# Patient Record
Sex: MALE | Race: BLACK OR AFRICAN AMERICAN | NOT HISPANIC OR LATINO | Employment: FULL TIME | URBAN - METROPOLITAN AREA
[De-identification: names, ages, dates, MRNs, and addresses within clinical notes are randomized per-mention and may not be internally consistent; named-entity substitution may affect disease eponyms.]

---

## 2023-04-07 ENCOUNTER — OFFICE VISIT (OUTPATIENT)
Dept: URGENT CARE | Facility: CLINIC | Age: 63
End: 2023-04-07
Payer: OTHER MISCELLANEOUS

## 2023-04-07 VITALS
RESPIRATION RATE: 16 BRPM | SYSTOLIC BLOOD PRESSURE: 152 MMHG | WEIGHT: 250 LBS | DIASTOLIC BLOOD PRESSURE: 75 MMHG | OXYGEN SATURATION: 98 % | HEIGHT: 73 IN | TEMPERATURE: 99 F | HEART RATE: 88 BPM | BODY MASS INDEX: 33.13 KG/M2

## 2023-04-07 DIAGNOSIS — S20.211D CONTUSION OF RIB ON RIGHT SIDE, SUBSEQUENT ENCOUNTER: ICD-10-CM

## 2023-04-07 DIAGNOSIS — Z02.6 ENCOUNTER RELATED TO WORKER'S COMPENSATION CLAIM: Primary | ICD-10-CM
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
## 2023-04-07 DIAGNOSIS — S89.91XD LEG INJURY, RIGHT, SUBSEQUENT ENCOUNTER: ICD-10-CM

## 2023-04-07 DIAGNOSIS — S72.491D OTHER CLOSED FRACTURE OF DISTAL END OF RIGHT FEMUR WITH ROUTINE HEALING, SUBSEQUENT ENCOUNTER: ICD-10-CM

## 2023-04-07 PROCEDURE — 99203 OFFICE O/P NEW LOW 30 MIN: CPT | Mod: S$GLB,,,

## 2023-04-07 PROCEDURE — 99203 PR OFFICE/OUTPT VISIT, NEW, LEVL III, 30-44 MIN: ICD-10-PCS | Mod: S$GLB,,,

## 2023-04-07 NOTE — LETTER
[facility]  [address] [facility], [address]  [place] [address]  Phone: [phone]  Fax: [phone]  [facility]: [id number]    Pt Name: Ady Gaines  Injury Date: 04/06/2023   Employee ID:  Date of First Treatment: 04/07/2023   Company: [employer] to record [id number]      Appointment Time: 07:00 PM Arrived: 7:00 pm   Provider: Song Richards PA-C Time Out:8:22 pm     Office Treatment:   1. Encounter related to worker's compensation claim    2. Fall, initial encounter    3. Leg injury, right, subsequent encounter    4. Contusion of rib on right side, subsequent encounter    5. Other closed fracture of distal end of right femur with routine healing, subsequent encounter                     Return Appointment: 04/10/2023 at 10:30 am  SW

## 2023-04-08 NOTE — PROGRESS NOTES
Subjective:      Patient ID: Ady Gaines is a 62 y.o. male.    Chief Complaint: Knee Injury (DOI-[id number]/ right knee /sw)    Patient's place of employment - [employer]  Patient's job title -    Date of injury - 04/06/2023  Body part injured including left or right - Right knee, right side rib cage  Injury Mechanism - Fall  What they were doing when they got hurt - climbing down a ladder   What they did immediately after - Continue to work before reporting  Pain scale right now - 9  SW    Patient is a 62-year-old male who presents for a fall that occurred at work yesterday.  Patient states that he was on a ladder to work on some pipes.  He was about 4-5 feet above the ground.  States that the ladder slipped while he was trying to get down and he fell on his right ribs, right knee, left hand.  [facility]  X-rays were obtained.  There are no fractures of the ribs or the left hand.  Right knee x-ray shows oblique fracture of the distal femoral diaphysis with potential extension into articular surface of medial condyle. He was placed in a knee immobilizer and given crutches.  Pain is well controlled with over-the-counter ibuprofen.  Referral was placed for Orthopedics while he was in the ED. Denies any SOB, nausea, vomiting, headaches, loss of consciousness, numbness or tingling.    Knee Injury  This is a new problem. The current episode started yesterday. The problem occurs constantly. The problem has been gradually worsening. Associated symptoms include arthralgias, chest pain (R ribs) and joint swelling. Pertinent negatives include no abdominal pain, coughing, fever, headaches, nausea, neck pain, numbness or vomiting. The symptoms are aggravated by walking and standing. He has tried NSAIDs for the symptoms. The treatment provided mild relief.     Constitution: Negative for fever.   HENT:  Negative for facial trauma.    Neck: Negative for neck pain and neck stiffness.    Cardiovascular:  Positive for chest pain (R ribs).   Eyes:  Negative for photophobia, vision loss, double vision and blurred vision.   Respiratory:  Negative for cough and shortness of breath.    Gastrointestinal:  Negative for abdominal pain, nausea and vomiting.   Musculoskeletal:  Positive for joint pain and joint swelling.   Skin:  Positive for bruising. Negative for wound and laceration.   Neurological:  Negative for dizziness, light-headedness, passing out, headaches and numbness.   Objective:     Physical Exam  Vitals and nursing note reviewed.   Constitutional:       General: He is not in acute distress.     Appearance: He is normal weight. He is not toxic-appearing.   HENT:      Head: Normocephalic and atraumatic.      Right Ear: External ear normal.      Left Ear: External ear normal.      Nose: Nose normal.      Mouth/Throat:      Pharynx: Oropharynx is clear.   Eyes:      Extraocular Movements: Extraocular movements intact.      Conjunctiva/sclera: Conjunctivae normal.      Pupils: Pupils are equal, round, and reactive to light.   Cardiovascular:      Rate and Rhythm: Normal rate and regular rhythm.      Pulses: Normal pulses.      Heart sounds: Normal heart sounds.   Pulmonary:      Effort: Pulmonary effort is normal. No respiratory distress.      Breath sounds: Normal breath sounds.   Chest:      Chest wall: Tenderness (Right ribs) present.   Abdominal:      General: There is no distension.      Palpations: Abdomen is soft.      Tenderness: There is no abdominal tenderness.   Musculoskeletal:         General: Signs of injury present.      Right hand: Normal.      Left hand: Normal.      Cervical back: Normal range of motion and neck supple.      Right knee: Swelling present. Decreased range of motion. Tenderness present.      Left knee: Normal. No swelling. Normal range of motion. No tenderness.      Comments: R knee in immobilizer. Neurovascularly intact distally.   Skin:     General: Skin is warm  and dry.      Findings: Bruising present.   Neurological:      General: No focal deficit present.      Mental Status: He is alert.      Cranial Nerves: No cranial nerve deficit.      Assessment:      1. Encounter related to worker's compensation claim    2. Fall, initial encounter    3. Leg injury, right, subsequent encounter    4. Contusion of rib on right side, subsequent encounter    5. Other closed fracture of distal end of right femur with routine healing, subsequent encounter      Plan:               No follow-ups on file.    Patient Instructions   Take over-the-counter Tylenol or ibuprofen for pain.  Nonweightbearing.  Rest, ice, compression, elevation.  Follow-up with occupational health on 04/10/2023.

## 2023-04-08 NOTE — PATIENT INSTRUCTIONS
Take over-the-counter Tylenol or ibuprofen for pain.  Nonweightbearing.  Rest, ice, compression, elevation.  Follow-up with occupational health on 04/10/2023.

## 2023-04-10 ENCOUNTER — OFFICE VISIT (OUTPATIENT)
Dept: URGENT CARE | Facility: CLINIC | Age: 63
End: 2023-04-10
Payer: OTHER MISCELLANEOUS

## 2023-04-10 VITALS
OXYGEN SATURATION: 98 % | RESPIRATION RATE: 18 BRPM | WEIGHT: 250 LBS | HEIGHT: 73 IN | BODY MASS INDEX: 33.13 KG/M2 | HEART RATE: 97 BPM | SYSTOLIC BLOOD PRESSURE: 146 MMHG | DIASTOLIC BLOOD PRESSURE: 79 MMHG
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
## 2023-04-10 DIAGNOSIS — Z02.6 ENCOUNTER RELATED TO WORKER'S COMPENSATION CLAIM: ICD-10-CM

## 2023-04-10 DIAGNOSIS — S20.211A RIB CONTUSION, RIGHT, INITIAL ENCOUNTER: ICD-10-CM

## 2023-04-10 DIAGNOSIS — S72.444A: Primary | ICD-10-CM

## 2023-04-10 PROCEDURE — 99203 PR OFFICE/OUTPT VISIT, NEW, LEVL III, 30-44 MIN: ICD-10-PCS | Mod: S$GLB,,, | Performed by: EMERGENCY MEDICINE

## 2023-04-10 PROCEDURE — 71100 XR RIBS 2 VIEW RIGHT: ICD-10-PCS | Mod: RT,S$GLB,, | Performed by: RADIOLOGY

## 2023-04-10 PROCEDURE — 99203 OFFICE O/P NEW LOW 30 MIN: CPT | Mod: S$GLB,,, | Performed by: EMERGENCY MEDICINE

## 2023-04-10 PROCEDURE — 73562 X-RAY EXAM OF KNEE 3: CPT | Mod: RT,S$GLB,, | Performed by: RADIOLOGY

## 2023-04-10 PROCEDURE — 73562 XR KNEE 3 VIEW RIGHT: ICD-10-PCS | Mod: RT,S$GLB,, | Performed by: RADIOLOGY

## 2023-04-10 PROCEDURE — 71100 X-RAY EXAM RIBS UNI 2 VIEWS: CPT | Mod: RT,S$GLB,, | Performed by: RADIOLOGY

## 2023-04-10 RX ORDER — NAPROXEN 500 MG/1
500 TABLET ORAL 2 TIMES DAILY WITH MEALS
Qty: 42 TABLET | Refills: 0 | Status: SHIPPED | OUTPATIENT
Start: 2023-04-10 | End: 2023-05-01

## 2023-04-10 NOTE — PROGRESS NOTES
Subjective:      Patient ID: Ady Gaines is a 62 y.o. male.    Chief Complaint: Knee Injury (DOI: 04/06/2023 Knee Injury/LM)    Patient's place of employment - [employer]  Patient's job title -    Date of injury - 04/06/2023  Body part injured including left or right - Right knee, right side rib cage  Injury Mechanism - Fall  What they were doing when they got hurt - climbing down a ladder   What they did immediately after - Continue to work before reporting. 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  HE WORKS FOR [employer] IN THIS HAPPENED ON THURSDAY AND WAS SEEN AS IS POLICY [facility] WHERE X-RAYS WERE PERFORMED.  X-RAYS OF THE RIBS AND HAND AND WRIST ARE NEGATIVE IN NORMAL HOWEVER HAS A SIGNIFICANT INJURY TO THE RIGHT KNEES SPECIFICALLY FRACTURE OF THE DISTAL FEMUR RIGHT MEDIAL CONDYLE POSSIBLY INTRA-ARTICULAR.  SINCE THEN HE WAS PLACED IN A KNEE IMMOBILIZER WHICH HAS BEEN WRITING LOW BASED ON MY EVALUATION AND REPORTS PAIN AND DIFFICULTY GETTING AROUND WITH CRUTCHES.  HE STATES THAT MEDICATION CONSTIPATES HIM SO HE HAS NOT TAKEN ANYTHING.  HE WAS SEEN HERE FRIDAY FOR FOLLOW-UP HOWEVER WAS HOSPITAL HOLIDAY AND RETURN HERE FOR FURTHER EVALUATION.  PATIENT CLEARLY NEEDS AN ORTHOPEDIC REFERRAL AND THIS HAS BEEN DONE.  HE NEEDS TO BE NONWEIGHTBEARING AND WILL BE DISABLED UNTIL DEFINITIVE MANAGEMENT BY ORTHOPEDICS.  [facility] REFERRAL MADE AND WILL BE KEPT IN KNEE IMMOBILIZER AND CRUTCHES AND NONWEIGHTBEARING.  I SEE THE REPORTS FROM THE RADIOLOGIST HOWEVER WILL GET PLAIN FILMS SAYS THAT THEY ARE ON FILE TODAY OF THE RIGHT RIBS AND RIGHT KNEE.  I WILL SET UP HER OWN APPOINTMENT IN 2 WEEKS HOWEVER PATIENT MAY CANCEL ONCE  ESTABLISHED AND PLAN OF CARE ESTABLISHED WITH ORTHOPEDICS.    Knee Injury  Associated symptoms include arthralgias, chest pain (R ribs) and joint swelling. Pertinent negatives include no abdominal pain, chills, coughing, fever, headaches, nausea, neck pain, numbness, sore throat or vomiting.     ROS    Constitution: Negative for chills and fever.   HENT:  Negative for facial trauma, postnasal drip, sinus pain and sore throat.    Neck: Negative for neck pain and neck stiffness.   Cardiovascular:  Positive for chest pain (R ribs). Negative for palpitations.   Eyes:  Negative for photophobia, vision loss, double vision and blurred vision.   Respiratory:  Negative for cough and shortness of breath.    Gastrointestinal:  Negative for abdominal pain, nausea and vomiting.   Genitourinary:  Negative for dysuria and hematuria.   Musculoskeletal:  Positive for pain, trauma, joint pain, joint swelling and abnormal ROM of joint.   Skin:  Positive for bruising. Negative for wound and laceration.   Neurological:  Negative for dizziness, light-headedness, passing out, headaches, altered mental status, numbness and tingling.   Psychiatric/Behavioral:  Negative for altered mental status.    Objective:     Physical Exam  Vitals and nursing note reviewed.   Constitutional:       General: He is not in acute distress.     Appearance: He is normal weight. He is not toxic-appearing.   HENT:      Head: Normocephalic and atraumatic.      Right Ear: External ear normal.      Left Ear: External ear normal.      Nose: Nose normal.      Mouth/Throat:      Pharynx: Oropharynx is clear.   Eyes:      Extraocular Movements: Extraocular movements intact.      Conjunctiva/sclera: Conjunctivae normal.      Pupils: Pupils are equal, round, and reactive to light.   Cardiovascular:      Rate and Rhythm: Normal rate and regular rhythm.      Pulses: Normal pulses.      Heart sounds: Normal heart sounds.   Pulmonary:      Effort: Pulmonary effort is normal.  No respiratory distress.      Breath sounds: Normal breath sounds.   Chest:      Chest wall: Tenderness (Right ribs) present.   Abdominal:      General: There is no distension.      Palpations: Abdomen is soft.      Tenderness: There is no abdominal tenderness.   Musculoskeletal:         General: Swelling, tenderness and signs of injury present.      Right hand: Normal.      Left hand: Normal.      Cervical back: Normal range of motion and neck supple.      Right knee: Swelling present. Decreased range of motion. Tenderness present.      Left knee: Normal. No swelling. Normal range of motion. No tenderness.      Comments: R knee in immobilizer. Neurovascularly intact distally.  THERE IS SIGNIFICANT WARMTH AND SWELLING OF THE RIGHT KNEE AND TENDERNESS OF THE ANTEROMEDIAL AND ANTEROLATERAL KNEE.  DISTALLY NEUROVASCULARLY INTACT.  SIGNIFICANT PAIN WITH RANGE OF MOTION.   Skin:     General: Skin is warm and dry.      Findings: Bruising present.   Neurological:      General: No focal deficit present.      Mental Status: He is alert.      Cranial Nerves: No cranial nerve deficit.      Assessment:      1. Closed nondisplaced fracture of distal epiphysis of right femur, initial encounter    2. Encounter related to worker's compensation claim    3. Rib contusion, right, initial encounter    4. 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  HE WORKS FOR [employer] IN THIS HAPPENED ON THURSDAY AND WAS SEEN AS IS POLICY IN [facility] WHERE X-RAYS WERE PERFORMED.  X-RAYS OF THE RIBS AND HAND AND WRIST ARE NEGATIVE IN NORMAL HOWEVER HAS A SIGNIFICANT INJURY TO THE RIGHT KNEES SPECIFICALLY FRACTURE OF THE DISTAL FEMUR RIGHT MEDIAL CONDYLE POSSIBLY INTRA-ARTICULAR.  SINCE THEN HE WAS PLACED IN A  KNEE IMMOBILIZER WHICH HAS BEEN WRITING LOW BASED ON MY EVALUATION AND REPORTS PAIN AND DIFFICULTY GETTING AROUND WITH CRUTCHES.  HE STATES THAT MEDICATION CONSTIPATES HIM SO HE HAS NOT TAKEN ANYTHING.  HE WAS SEEN HERE FRIDAY FOR FOLLOW-UP HOWEVER WAS HOSPITAL HOLIDAY AND RETURN HERE FOR FURTHER EVALUATION.  PATIENT CLEARLY NEEDS AN ORTHOPEDIC REFERRAL AND THIS HAS BEEN DONE.  HE NEEDS TO BE NONWEIGHTBEARING AND WILL BE DISABLED UNTIL DEFINITIVE MANAGEMENT BY ORTHOPEDICS.  [facility] REFERRAL MADE AND WILL BE KEPT IN KNEE IMMOBILIZER AND CRUTCHES AND NONWEIGHTBEARING.  I SEE THE REPORTS FROM THE RADIOLOGIST HOWEVER WILL GET PLAIN FILMS SAYS THAT THEY ARE ON FILE TODAY OF THE RIGHT RIBS AND RIGHT KNEE.  I WILL SET UP HER OWN APPOINTMENT IN 2 WEEKS HOWEVER PATIENT MAY CANCEL ONCE ESTABLISHED AND PLAN OF CARE ESTABLISHED WITH ORTHOPEDICS.    Medications Ordered This Encounter   Medications    naproxen (NAPROSYN) 500 MG tablet     Sig: Take 1 tablet (500 mg total) by mouth 2 (two) times daily with meals. for 21 days     Dispense:  42 tablet     Refill:  0     Patient Instructions: Attention not to aggravate affected area, Elevated affected area, Referral to specialist to be scheduled, once authorized (LONG LEG/KNEE IMMOBILZER AT ALL TIMES. NO WEIGHT BEARING AT ALL. CRUTCHES.)   Restrictions: Disabled until next office visit  Follow up in about 2 weeks (around 4/24/2023) for MAY CANCEL THIS APPOINTMENT WHEN ESTABLISHED WITH ORTHOPEDICS (STAT REFERRAL MADE) .

## 2023-04-18 NOTE — LETTER
[facility] - Urgent Care  [address] [facility], [address]  [place] [address]  Phone: [phone]  Fax: [phone]  [facility]: [id number]    Pt Name: Ady Gaines  Injury Date: 04/06/2023   Employee ID: [id number] Date of First Treatment: 04/10/2023   Company: [employer]      Appointment Time: 10:30 am Arrived: 10:30 am   Provider: Bigg Hidalgo MD Time Out: 12:20 am     Office Treatment:   1. Closed nondisplaced fracture of distal epiphysis of right femur, initial encounter    2. Encounter related to worker's compensation claim    3. Rib contusion, right, initial encounter    4. Fall from ladder, initial encounter      Medications Ordered This Encounter   Medications    naproxen (NAPROSYN) 500 MG tablet      Patient Instructions: Attention not to aggravate affected area, Elevated affected area, Referral to specialist to be scheduled, once authorized (LONG LEG/KNEE IMMOBILZER AT ALL TIMES. NO WEIGHT BEARING AT ALL. CRUTCHES.)    Restrictions: Disabled until next office visit     Return Appointment: 4/24/2023 at 10:00 am  BINDU            no

## 2023-04-24 ENCOUNTER — OFFICE VISIT (OUTPATIENT)
Dept: URGENT CARE | Facility: CLINIC | Age: 63
End: 2023-04-24
Payer: OTHER MISCELLANEOUS

## 2023-04-24 DIAGNOSIS — S72.444D CLOSED NONDISPLACED FRACTURE OF DISTAL EPIPHYSIS OF RIGHT FEMUR WITH ROUTINE HEALING, SUBSEQUENT ENCOUNTER: Primary | ICD-10-CM

## 2023-04-24 DIAGNOSIS — S20.211D RIB CONTUSION, RIGHT, SUBSEQUENT ENCOUNTER: ICD-10-CM

## 2023-04-24 PROCEDURE — 99214 PR OFFICE/OUTPT VISIT, EST, LEVL IV, 30-39 MIN: ICD-10-PCS | Mod: S$GLB,,, | Performed by: EMERGENCY MEDICINE

## 2023-04-24 PROCEDURE — 99214 OFFICE O/P EST MOD 30 MIN: CPT | Mod: S$GLB,,, | Performed by: EMERGENCY MEDICINE

## 2023-04-24 NOTE — LETTER
[facility]  [address] [facility], [address]  [place] [address]  Phone: [phone]  Fax: [phone]  [facility]: [id number]    Pt Name: Ady Gaines  Injury Date: 04/06/2023   Employee ID:  Date of First Treatment: 04/24/2023   Company: [employer]      Appointment Time: 09:45 AM Arrived: ***   Provider: Bigg Hidalgo MD Time Out:***     Office Treatment:   1. Encounter related to worker's compensation claim                     Return Appointment: Visit date not found at ***

## 2023-04-24 NOTE — PROGRESS NOTES
Subjective:      Patient ID: Ady Gaines is a 62 y.o. male.    Chief Complaint: Knee Injury (DOI: 04/06/2023 RT Knee/LM)    Patient's place of employment - [employer]  Patient's job title -   Date of Injury - 04/06/2023  Body part injured - RT Knee  Current work status per last visit - Disabled  Improved, same, or worse - Same, Still some swelling around the knee  Pain Scale right now (1-10) -  2 right now, bur when moving gets to an 8  LM    PATIENT HAS A SIGNIFICANT INJURY SPECIFICALLY RIGHT DISTAL FEMUR FRACTURE AND HAS ALREADY SEEN ORTHOPEDIST DR. FRANCISCO IMMEDIATELY 1 DAY AFTER BEING SEEN HERE IN CLINIC BY ME.  I HAD PLACED HIM ON CRUTCHES AND NONWEIGHTBEARING AND KNEE IMMOBILIZER AND ABSOLUTELY NO BEARING OF WEIGHT.  HE IS DISABLED AT THIS TIME.  HE HAS MRIS AND DEFINITIVE CARE WITH ORTHOPEDICS AND FOLLOW-UP WITH ORTHOPEDICS ALREADY SCHEDULED.  DOES NOT TAKE THE TRAMADOL THAT WAS PRESCRIBED TO HIM BY ORTHOPEDICS HOWEVER IS TAKING ANTI-INFLAMMATORY.  HE WILL BE DISCHARGED TO THE CARE OF ORTHOPEDICS FROM HERE.  HE KNOWS THAT HE MAY RETURN P.R.N..    Knee Injury  Associated symptoms include arthralgias, chest pain (R ribs) and joint swelling. Pertinent negatives include no abdominal pain, chills, coughing, fever, headaches, nausea, neck pain, numbness, sore throat or vomiting.     ROS    Constitution: Negative for chills and fever.   HENT:  Negative for facial trauma, postnasal drip, sinus pain and sore throat.    Neck: Negative for neck pain and neck stiffness.   Cardiovascular:  Positive for chest pain (R ribs). Negative for palpitations.   Eyes:  Negative for photophobia, vision loss, double vision and blurred vision.   Respiratory:  Negative for cough and shortness of breath.    Gastrointestinal:  Negative for abdominal pain, nausea and vomiting.   Genitourinary:  Negative for dysuria and hematuria.   Musculoskeletal:  Positive for pain, trauma, joint pain, joint swelling and abnormal ROM of joint.    Skin:  Positive for bruising. Negative for wound and laceration.   Neurological:  Negative for dizziness, light-headedness, passing out, headaches, altered mental status, numbness and tingling.   Psychiatric/Behavioral:  Negative for altered mental status.    Objective:     Physical Exam  Vitals and nursing note reviewed.   Constitutional:       General: He is not in acute distress.     Appearance: He is normal weight. He is not toxic-appearing.   HENT:      Head: Normocephalic and atraumatic.      Right Ear: External ear normal.      Left Ear: External ear normal.      Nose: Nose normal.      Mouth/Throat:      Pharynx: Oropharynx is clear.   Eyes:      Extraocular Movements: Extraocular movements intact.      Conjunctiva/sclera: Conjunctivae normal.      Pupils: Pupils are equal, round, and reactive to light.   Cardiovascular:      Rate and Rhythm: Normal rate and regular rhythm.      Pulses: Normal pulses.      Heart sounds: Normal heart sounds.   Pulmonary:      Effort: Pulmonary effort is normal. No respiratory distress.      Breath sounds: Normal breath sounds.   Chest:      Chest wall: No tenderness (Right ribs).   Abdominal:      General: There is no distension.      Palpations: Abdomen is soft.      Tenderness: There is no abdominal tenderness.   Musculoskeletal:         General: Swelling, tenderness and signs of injury present.      Right hand: Normal.      Left hand: Normal.      Cervical back: Normal range of motion and neck supple.      Right knee: Swelling present. Decreased range of motion. Tenderness present.      Left knee: Normal. No swelling. Normal range of motion. No tenderness.      Comments: R knee in immobilizer. Neurovascularly intact distally.  THERE IS SIGNIFICANT WARMTH AND SWELLING OF THE RIGHT KNEE AND TENDERNESS OF THE ANTEROMEDIAL AND ANTEROLATERAL KNEE.  DISTALLY NEUROVASCULARLY INTACT.  SIGNIFICANT PAIN WITH RANGE OF MOTION.   Skin:     General: Skin is warm and dry.       Findings: Bruising present.   Neurological:      General: No focal deficit present.      Mental Status: He is alert.      Cranial Nerves: No cranial nerve deficit.         X-Ray Ribs 2 View Right    Result Date: 4/10/2023  EXAMINATION: XR RIBS 2 VIEW RIGHT CLINICAL HISTORY: Fall on and from ladder, initial encounter TECHNIQUE: Two views of the right ribs were performed. COMPARISON: None FINDINGS: No definite acute displaced right-sided rib fracture.  No definite acute abnormality in the visualized portions the chest or abdomen.  No radiopaque foreign body identified.     No convincing evidence of acute displaced right-sided rib fracture. Electronically signed by: Chi Miller Date:    04/10/2023 Time:    12:42    XR KNEE 3 VIEW RIGHT    Result Date: 4/10/2023  EXAMINATION: XR KNEE 3 VIEW RIGHT CLINICAL HISTORY: Fall on and from ladder, initial encounter TECHNIQUE: AP, lateral, and Merchant views of the right knee were performed. COMPARISON: None FINDINGS: Acute, comminuted mildly displaced obliquely oriented fracture through the lateral aspect of the distal femoral metadiaphysis, lateral femoral condyle, with intra-articular extension to the tibiofemoral joint. Additionally, there is suspected essentially nondisplaced fracture involving superior pole of the patella, likely extending to the patellofemoral joint. Large suprapatellar joint effusion, suspected hemarthrosis in the given context. DJD of the right knee joint is additionally noted.  Partially imaged DJD of the left knee joint.     Acute, comminuted mildly displaced obliquely oriented fracture through the lateral aspect of the distal femoral metadiaphysis, lateral femoral condyle, with intra-articular extension to the tibiofemoral joint. Additionally, there is suspected essentially nondisplaced fracture involving superior pole of the patella, likely extending to the patellofemoral joint. Large suprapatellar joint effusion, suspected hemarthrosis in the  given context. Electronically signed by: Chi Miller Date:    04/10/2023 Time:    12:46     Assessment:      1. Closed nondisplaced fracture of distal epiphysis of right femur with routine healing, subsequent encounter    2. Rib contusion, right, subsequent encounter      Plan:     PATIENT HAS A SIGNIFICANT INJURY SPECIFICALLY RIGHT DISTAL FEMUR FRACTURE AND HAS ALREADY SEEN ORTHOPEDIST DR. FRANCISCO IMMEDIATELY 1 DAY AFTER BEING SEEN HERE IN CLINIC BY ME.  I HAD PLACED HIM ON CRUTCHES AND NONWEIGHTBEARING AND KNEE IMMOBILIZER AND ABSOLUTELY NO BEARING OF WEIGHT.  HE IS DISABLED AT THIS TIME.  HE HAS MRIS AND DEFINITIVE CARE WITH ORTHOPEDICS AND FOLLOW-UP WITH ORTHOPEDICS ALREADY SCHEDULED.  DOES NOT TAKE THE TRAMADOL THAT WAS PRESCRIBED TO HIM BY ORTHOPEDICS HOWEVER IS TAKING ANTI-INFLAMMATORY.  HE WILL BE DISCHARGED TO THE CARE OF ORTHOPEDICS FROM HERE.  HE KNOWS THAT HE MAY RETURN P.R.N..         Patient Instructions: Attention not to aggravate affected area (NON-WEIGHT BEARING, CRUTCHES, KNEE IMMOBILIZER)   Restrictions: Discharged to Ortho/Neuro/Opthomologist/Surgeon, Disabled until next office visit  Follow up if symptoms worsen or fail to improve, for KEEP FOLLOW UP WITH ORTHOPEDICS.

## 2023-04-24 NOTE — LETTER
[facility] - [facility]  [address] [facility], [address]  [place] [address]  Phone: [phone]  Fax: [phone]  [facility]: [id number]    Pt Name: Ady Gaines  Injury Date: 04/06/2023   Employee ID:  Date of Treatment: 04/24/2023   Company: [employer]      Appointment Time: 10:00 am Arrived: 10:00 am   Provider: Bigg Hidalgo MD Time Out: 11:40 am     Office Treatment:   1. Closed nondisplaced fracture of distal epiphysis of right femur with routine healing, subsequent encounter    2. Rib contusion, right, subsequent encounter          Patient Instructions: Attention not to aggravate affected area (NON-WEIGHT BEARING, CRUTCHES, KNEE IMMOBILIZER)    Restrictions: Discharged to Ortho/Neuro/Opthomologist/Surgeon, Disabled until next office visit     Return Appointment: None.  Follow up if symptoms worsen or fail to improve.   BINDU

## 2023-04-24 NOTE — LETTER
[facility]  [address] [facility], [address]  [place] [address]  Phone: [phone]  Fax: [phone]  [facility]: [id number]    Pt Name: Ady Gaines  Injury Date: 04/06/2023   Employee ID:  Date of First Treatment: 04/24/2023   Company: [employer]      Appointment Time: 09:45 AM Arrived: ***   Provider: Bigg Hidalgo MD Time Out:***     Office Treatment:   1. Closed nondisplaced fracture of distal epiphysis of right femur with routine healing, subsequent encounter    2. Rib contusion, right, subsequent encounter                     Return Appointment: Visit date not found at ***

## 2023-04-24 NOTE — LETTER
April 24, 2023      [facility] - Urgent Care  [address] [facility], [address]  [place] [address]  Phone: [phone]  Fax: [phone]       Patient: Ady Gaines   YOB: 1960  Date of Visit: 04/24/2023    To Whom It May Concern:    James Gaines  was at [facility] on 04/24/2023. The patient may return to work/school on 04/24/2023 with no restrictions. If you have any questions or concerns, or if I can be of further assistance, please do not hesitate to contact me.    Sincerely,    Donald Burk MA